# Patient Record
Sex: MALE | Race: WHITE | HISPANIC OR LATINO
[De-identification: names, ages, dates, MRNs, and addresses within clinical notes are randomized per-mention and may not be internally consistent; named-entity substitution may affect disease eponyms.]

---

## 2017-11-03 PROBLEM — Z00.00 ENCOUNTER FOR PREVENTIVE HEALTH EXAMINATION: Status: ACTIVE | Noted: 2017-11-03

## 2017-11-16 ENCOUNTER — APPOINTMENT (OUTPATIENT)
Dept: ORTHOPEDIC SURGERY | Facility: CLINIC | Age: 42
End: 2017-11-16
Payer: COMMERCIAL

## 2017-11-16 VITALS — SYSTOLIC BLOOD PRESSURE: 159 MMHG | DIASTOLIC BLOOD PRESSURE: 97 MMHG | HEART RATE: 97 BPM

## 2017-11-16 VITALS — WEIGHT: 255 LBS | HEIGHT: 69 IN | BODY MASS INDEX: 37.77 KG/M2

## 2017-11-16 DIAGNOSIS — M25.642 STIFFNESS OF LEFT HAND, NOT ELSEWHERE CLASSIFIED: ICD-10-CM

## 2017-11-16 DIAGNOSIS — Z85.47 PERSONAL HISTORY OF MALIGNANT NEOPLASM OF TESTIS: ICD-10-CM

## 2017-11-16 DIAGNOSIS — M25.522 PAIN IN LEFT ELBOW: ICD-10-CM

## 2017-11-16 DIAGNOSIS — M25.641 STIFFNESS OF RIGHT HAND, NOT ELSEWHERE CLASSIFIED: ICD-10-CM

## 2017-11-16 DIAGNOSIS — Z78.9 OTHER SPECIFIED HEALTH STATUS: ICD-10-CM

## 2017-11-16 PROCEDURE — 73130 X-RAY EXAM OF HAND: CPT | Mod: 50

## 2017-11-16 PROCEDURE — 73080 X-RAY EXAM OF ELBOW: CPT | Mod: LT

## 2017-11-16 PROCEDURE — 99204 OFFICE O/P NEW MOD 45 MIN: CPT

## 2017-11-16 RX ORDER — DICLOFENAC SODIUM 75 MG/1
75 TABLET, DELAYED RELEASE ORAL
Qty: 60 | Refills: 1 | Status: ACTIVE | COMMUNITY
Start: 2017-11-16 | End: 1900-01-01

## 2017-11-16 RX ORDER — OXYCODONE HYDROCHLORIDE 30 MG/1
TABLET ORAL
Refills: 0 | Status: ACTIVE | COMMUNITY

## 2018-02-27 ENCOUNTER — APPOINTMENT (OUTPATIENT)
Dept: ORTHOPEDIC SURGERY | Facility: CLINIC | Age: 43
End: 2018-02-27

## 2018-02-28 ENCOUNTER — APPOINTMENT (OUTPATIENT)
Dept: ORTHOPEDIC SURGERY | Facility: CLINIC | Age: 43
End: 2018-02-28

## 2018-03-02 ENCOUNTER — APPOINTMENT (OUTPATIENT)
Dept: ORTHOPEDIC SURGERY | Facility: CLINIC | Age: 43
End: 2018-03-02

## 2018-03-05 ENCOUNTER — APPOINTMENT (OUTPATIENT)
Dept: ORTHOPEDIC SURGERY | Facility: CLINIC | Age: 43
End: 2018-03-05
Payer: COMMERCIAL

## 2018-03-05 VITALS
HEIGHT: 69 IN | HEART RATE: 93 BPM | SYSTOLIC BLOOD PRESSURE: 141 MMHG | DIASTOLIC BLOOD PRESSURE: 96 MMHG | WEIGHT: 250 LBS | BODY MASS INDEX: 37.03 KG/M2

## 2018-03-05 DIAGNOSIS — S69.82XA OTHER SPECIFIED INJURIES OF LEFT WRIST, HAND AND FINGER(S), INITIAL ENCOUNTER: ICD-10-CM

## 2018-03-05 PROCEDURE — 99213 OFFICE O/P EST LOW 20 MIN: CPT | Mod: 25

## 2018-03-05 PROCEDURE — 20605 DRAIN/INJ JOINT/BURSA W/O US: CPT | Mod: LT

## 2018-05-14 ENCOUNTER — APPOINTMENT (OUTPATIENT)
Dept: ORTHOPEDIC SURGERY | Facility: CLINIC | Age: 43
End: 2018-05-14
Payer: COMMERCIAL

## 2018-05-14 DIAGNOSIS — S60.221A CONTUSION OF RIGHT HAND, INITIAL ENCOUNTER: ICD-10-CM

## 2018-05-14 PROCEDURE — 73130 X-RAY EXAM OF HAND: CPT | Mod: RT

## 2018-05-14 PROCEDURE — 99213 OFFICE O/P EST LOW 20 MIN: CPT

## 2018-06-08 ENCOUNTER — TRANSCRIPTION ENCOUNTER (OUTPATIENT)
Age: 43
End: 2018-06-08

## 2019-07-29 ENCOUNTER — APPOINTMENT (OUTPATIENT)
Dept: ORTHOPEDIC SURGERY | Facility: CLINIC | Age: 44
End: 2019-07-29
Payer: COMMERCIAL

## 2019-07-29 VITALS
HEIGHT: 69 IN | WEIGHT: 250 LBS | HEART RATE: 109 BPM | DIASTOLIC BLOOD PRESSURE: 83 MMHG | SYSTOLIC BLOOD PRESSURE: 118 MMHG | BODY MASS INDEX: 37.03 KG/M2

## 2019-07-29 DIAGNOSIS — M53.3 SACROCOCCYGEAL DISORDERS, NOT ELSEWHERE CLASSIFIED: ICD-10-CM

## 2019-07-29 PROCEDURE — 99214 OFFICE O/P EST MOD 30 MIN: CPT

## 2019-07-29 PROCEDURE — 72100 X-RAY EXAM L-S SPINE 2/3 VWS: CPT

## 2019-07-29 NOTE — DISCUSSION/SUMMARY
[de-identified] : Patient was seen today for evaluation of atraumatic lower back pain localized to the area of the coccyx. Patient has clinical findings consistent with coccydynia, x-ray today is normal, does not show any evidence of fracture. Patient has history of lumbar herniations and is on chronic pain management with narcotic pain control. Patient has been taking 30 mg oxycodone without relief of this issue. Patient also has history of testicular cancer. With these findings and clinical history would recommend advance imaging with MRI to evaluate for possible abnormal mass within the area of the coccyx, and/or coccyx fracture not seen on x-ray.  Patient started on a course of oral NSAIDs, prescription given for diclofenac. Patient will follow up as MRI results are available.  Patient appreciates and agrees with current plan.\par \par This note was generated using dragon medical dictation software.  A reasonable effort has been made for proofreading its contents, but typos may still remain.  If there are any questions or points of clarification needed please notify my office.

## 2019-07-29 NOTE — PHYSICAL EXAM
[de-identified] : Constitutional: Well-nourished, well-developed, No acute distress\par Respiratory:  Good respiratory effort, no SOB\par Lymphatic: No regional lymphadenopathy, no lymphedema\par Psychiatric: Pleasant and normal affect, alert and oriented x3\par Skin: Clean dry and intact B/L UE/LE\par Musculoskeletal: normal except where as noted in regional exam\par \par Lumbar Spine Exam\par \par APPEARANCE: no marked deformities or malalignment, normal curvature of the lumbosacral spine. no marked deformities, good posture.\par POSITIVE TENDERNESS: significant over the coccyx and inferior sacrum\par NONTENDER: no bony midline tenderness, no marked tenderness in paravertebral muscles or erector spinae group, no marked spasm.  no marked tenderness in lumbar, lumbosacral, or iliac areas.\par ROM: Mildly limited in all directions due to stiffness and pain\par RESISTIVE TESTING: mild pain 4/5 resisted flex/ext, sidebending b/l, and rotation\par \par PULSES: 2+ DP/PT pulses\par \par NEURO:  L1 - S2 intact to sensation and motor, DTRs 2+/4 patella and achilles\par B/L Hips: No asymmetry, malalignment, or swelling, Full ROM, 5/5 strength in flexion/ext, IR/ER, Abd/Add, Joints stable\par B/L Knees: No asymmetry, malalignment, or swelling, Full ROM, 5/5 strength in flexion/ext, Joints stable\par B/L Ankles: No asymmetry, malalignment, or swelling, Full ROM, 5/5 strength in DF/PF/Inv/Ev, Joints stable\par  [de-identified] : The following radiographs were ordered and read by me during this patient's visit. I reviewed each radiograph in detail with the patient and discussed the findings as highlighted below. \par \par 3 views of the lumbosacral spine/coccyx were obtained today that show degenerative changes and evidence of mild L5-S1 osteoarthritis.  No fracture, or dislocation seen at this time. There is no malalignment. No other obvious osseous abnormality. Otherwise unremarkable.
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
## 2019-07-30 ENCOUNTER — OTHER (OUTPATIENT)
Age: 44
End: 2019-07-30

## 2019-08-02 ENCOUNTER — OUTPATIENT (OUTPATIENT)
Dept: OUTPATIENT SERVICES | Facility: HOSPITAL | Age: 44
LOS: 1 days | End: 2019-08-02
Payer: COMMERCIAL

## 2019-08-02 ENCOUNTER — APPOINTMENT (OUTPATIENT)
Dept: MRI IMAGING | Facility: CLINIC | Age: 44
End: 2019-08-02
Payer: COMMERCIAL

## 2019-08-02 DIAGNOSIS — M53.3 SACROCOCCYGEAL DISORDERS, NOT ELSEWHERE CLASSIFIED: ICD-10-CM

## 2019-08-02 PROCEDURE — 72195 MRI PELVIS W/O DYE: CPT | Mod: 26

## 2019-08-02 PROCEDURE — 72195 MRI PELVIS W/O DYE: CPT

## 2019-08-07 ENCOUNTER — CLINICAL ADVICE (OUTPATIENT)
Age: 44
End: 2019-08-07

## 2020-04-20 ENCOUNTER — APPOINTMENT (OUTPATIENT)
Dept: ORTHOPEDIC SURGERY | Facility: CLINIC | Age: 45
End: 2020-04-20

## 2022-07-26 ENCOUNTER — NON-APPOINTMENT (OUTPATIENT)
Age: 47
End: 2022-07-26

## 2022-10-10 ENCOUNTER — NON-APPOINTMENT (OUTPATIENT)
Age: 47
End: 2022-10-10

## 2022-10-12 ENCOUNTER — APPOINTMENT (OUTPATIENT)
Dept: ORTHOPEDIC SURGERY | Facility: CLINIC | Age: 47
End: 2022-10-12